# Patient Record
Sex: MALE | Race: WHITE | NOT HISPANIC OR LATINO | URBAN - NONMETROPOLITAN AREA
[De-identification: names, ages, dates, MRNs, and addresses within clinical notes are randomized per-mention and may not be internally consistent; named-entity substitution may affect disease eponyms.]

---

## 2017-02-15 ENCOUNTER — FOLLOW UP ESTABLISHED (OUTPATIENT)
Dept: URBAN - NONMETROPOLITAN AREA CLINIC 3 | Facility: CLINIC | Age: 72
End: 2017-02-15
Payer: COMMERCIAL

## 2017-02-15 DIAGNOSIS — H11.151 PINGUECULA, RIGHT EYE: ICD-10-CM

## 2017-02-15 DIAGNOSIS — Z96.1 PRESENCE OF INTRAOCULAR LENS: ICD-10-CM

## 2017-02-15 DIAGNOSIS — H21.532 IRIDODIALYSIS, LEFT EYE: Primary | ICD-10-CM

## 2017-02-15 PROCEDURE — 92014 COMPRE OPH EXAM EST PT 1/>: CPT | Performed by: OPTOMETRIST

## 2017-02-15 PROCEDURE — 92015 DETERMINE REFRACTIVE STATE: CPT | Performed by: OPTOMETRIST

## 2017-02-15 ASSESSMENT — KERATOMETRY
OD: 42.63
OS: 42.75

## 2017-02-15 ASSESSMENT — VISUAL ACUITY
OS: 20/25
OD: 20/20

## 2017-02-15 ASSESSMENT — INTRAOCULAR PRESSURE
OS: 11
OD: 12

## 2021-10-07 ENCOUNTER — OFFICE VISIT (OUTPATIENT)
Dept: URBAN - NONMETROPOLITAN AREA CLINIC 3 | Facility: CLINIC | Age: 76
End: 2021-10-07
Payer: COMMERCIAL

## 2021-10-07 DIAGNOSIS — H04.123 DRY EYE SYNDROME OF BILATERAL LACRIMAL GLANDS: ICD-10-CM

## 2021-10-07 DIAGNOSIS — H53.2 DIPLOPIA: ICD-10-CM

## 2021-10-07 PROCEDURE — 92134 CPTRZ OPH DX IMG PST SGM RTA: CPT | Performed by: OPTOMETRIST

## 2021-10-07 PROCEDURE — 99203 OFFICE O/P NEW LOW 30 MIN: CPT | Performed by: OPTOMETRIST

## 2021-10-07 ASSESSMENT — VISUAL ACUITY
OD: 20/25
OS: 20/30

## 2021-10-07 ASSESSMENT — INTRAOCULAR PRESSURE
OD: 14
OS: 14

## 2021-10-07 NOTE — IMPRESSION/PLAN
Impression: Presbyopia: H52.4. Plan: Advised patient to wait until after YAG for new glasses Rx. Patient understands and accepts.

## 2021-10-07 NOTE — IMPRESSION/PLAN
Impression: Diplopia: H53.2. Plan: Discussed diagnosis in detail with patient. Intermittent horizontal diplopia as described by patient. Worse at distance. Not seen on exam today. Full eye movements. Advised patient to try covering one eye at home when it occurs to determine if binocular. Pt reports Parkinsons diagnosis. Discussed Parkinsons can affect eye muscles and cause diplopia. RTC if symptoms worsen.

## 2021-10-07 NOTE — IMPRESSION/PLAN
Impression: Other secondary cataract, bilateral: H26.493. Plan:  Discussed diagnosis with the patient. Discussed and reviewed treatment options for cataracts. YAG OU recommended. Patient understands and accepts.

## 2021-11-09 ENCOUNTER — ADULT PHYSICAL (OUTPATIENT)
Dept: URBAN - METROPOLITAN AREA CLINIC 24 | Facility: CLINIC | Age: 76
End: 2021-11-09
Payer: COMMERCIAL

## 2021-11-09 DIAGNOSIS — H26.493 OTHER SECONDARY CATARACT, BILATERAL: ICD-10-CM

## 2021-11-09 DIAGNOSIS — Z01.818 ENCOUNTER FOR OTHER PREPROCEDURAL EXAMINATION: Primary | ICD-10-CM

## 2021-11-09 PROCEDURE — 99203 OFFICE O/P NEW LOW 30 MIN: CPT | Performed by: PHYSICIAN ASSISTANT

## 2021-11-23 ENCOUNTER — SURGERY (OUTPATIENT)
Dept: URBAN - METROPOLITAN AREA SURGERY 12 | Facility: SURGERY | Age: 76
End: 2021-11-23
Payer: COMMERCIAL

## 2021-11-23 PROCEDURE — 66821 AFTER CATARACT LASER SURGERY: CPT | Performed by: OPHTHALMOLOGY

## 2021-12-07 ENCOUNTER — SURGERY (OUTPATIENT)
Dept: URBAN - METROPOLITAN AREA SURGERY 12 | Facility: SURGERY | Age: 76
End: 2021-12-07
Payer: COMMERCIAL

## 2021-12-07 PROCEDURE — 66821 AFTER CATARACT LASER SURGERY: CPT | Performed by: OPHTHALMOLOGY

## 2021-12-21 ENCOUNTER — POST-OPERATIVE VISIT (OUTPATIENT)
Dept: URBAN - NONMETROPOLITAN AREA CLINIC 3 | Facility: CLINIC | Age: 76
End: 2021-12-21
Payer: COMMERCIAL

## 2021-12-21 DIAGNOSIS — H52.4 PRESBYOPIA: ICD-10-CM

## 2021-12-21 DIAGNOSIS — Z48.810 ENCOUNTER FOR SURGICAL AFTERCARE FOLLOWING SURGERY ON A SENSE ORGAN: Primary | ICD-10-CM

## 2021-12-21 PROCEDURE — 99024 POSTOP FOLLOW-UP VISIT: CPT | Performed by: OPTOMETRIST

## 2021-12-21 ASSESSMENT — INTRAOCULAR PRESSURE
OS: 12
OD: 12

## 2021-12-21 ASSESSMENT — VISUAL ACUITY
OD: 20/20
OS: 20/25

## 2021-12-21 NOTE — IMPRESSION/PLAN
Impression:  Encounter for surgical aftercare following surgery on a sense organ  Z48.810. Excellent post op course   Post operative instructions reviewed - Condition is improving - Plan: Discussed diagnosis in detail with patient. New glasses Rx was given today at patients request. Discussed need for re-evaluation with patient. Patient understands and accepts.

## 2022-04-25 ENCOUNTER — OFFICE VISIT (OUTPATIENT)
Dept: URBAN - NONMETROPOLITAN AREA CLINIC 3 | Facility: CLINIC | Age: 77
End: 2022-04-25
Payer: COMMERCIAL

## 2022-04-25 DIAGNOSIS — H11.151 PINGUECULA, RIGHT EYE: ICD-10-CM

## 2022-04-25 DIAGNOSIS — H52.223 REGULAR ASTIGMATISM, BILATERAL: Primary | ICD-10-CM

## 2022-04-25 DIAGNOSIS — H53.142 PHOTOPHOBIA OF LEFT EYE: ICD-10-CM

## 2022-04-25 DIAGNOSIS — H21.532 IRIDODIALYSIS, LEFT EYE: ICD-10-CM

## 2022-04-25 PROCEDURE — 99213 OFFICE O/P EST LOW 20 MIN: CPT | Performed by: OPTOMETRIST

## 2022-04-25 ASSESSMENT — VISUAL ACUITY
OS: 20/20
OD: 20/20

## 2022-04-25 ASSESSMENT — INTRAOCULAR PRESSURE
OS: 16
OD: 16

## 2022-04-25 NOTE — IMPRESSION/PLAN
Impression: Iridodialysis, left eye Plan: OS: Discussed diagnosis in detail with patient. Discussed treatment options with patient. Patient prefers no treatment at this time.

## 2022-04-25 NOTE — IMPRESSION/PLAN
Impression: Photophobia of left eye: H53.142. Plan: secondary  to fixed dilated  pupil. Advised Transition material  as  medical necessity.

## 2024-04-04 ENCOUNTER — OFFICE VISIT (OUTPATIENT)
Dept: URBAN - NONMETROPOLITAN AREA CLINIC 3 | Facility: CLINIC | Age: 79
End: 2024-04-04
Payer: COMMERCIAL

## 2024-04-04 DIAGNOSIS — H04.123 DRY EYE SYNDROME OF BILATERAL LACRIMAL GLANDS: ICD-10-CM

## 2024-04-04 DIAGNOSIS — H53.121 TRANSIENT VISUAL LOSS, RIGHT EYE: Primary | ICD-10-CM

## 2024-04-04 DIAGNOSIS — H52.4 PRESBYOPIA: ICD-10-CM

## 2024-04-04 PROCEDURE — 99213 OFFICE O/P EST LOW 20 MIN: CPT

## 2024-04-04 ASSESSMENT — VISUAL ACUITY
OD: 20/25
OS: 20/25

## 2024-04-04 ASSESSMENT — INTRAOCULAR PRESSURE
OD: 14
OS: 15

## 2024-06-27 ENCOUNTER — OFFICE VISIT (OUTPATIENT)
Dept: URBAN - NONMETROPOLITAN AREA CLINIC 3 | Facility: CLINIC | Age: 79
End: 2024-06-27
Payer: COMMERCIAL

## 2024-06-27 DIAGNOSIS — H53.121 TRANSIENT VISUAL LOSS, RIGHT EYE: Primary | ICD-10-CM

## 2024-06-27 DIAGNOSIS — H04.123 DRY EYE SYNDROME OF BILATERAL LACRIMAL GLANDS: ICD-10-CM

## 2024-06-27 PROCEDURE — 99212 OFFICE O/P EST SF 10 MIN: CPT

## 2024-06-27 RX ORDER — PREDNISOLONE ACETATE 10 MG/ML
1 % SUSPENSION/ DROPS OPHTHALMIC
Qty: 5 | Refills: 0 | Status: ACTIVE
Start: 2024-06-27

## 2024-06-27 RX ORDER — CARBOXYMETHYLCELLULOSE SODIUM, GLYCERIN AND POLYSORBATE 80 5; 10; 5 MG/ML; MG/ML; MG/ML
SOLUTION/ DROPS OPHTHALMIC
Qty: 90 | Refills: 4 | Status: ACTIVE
Start: 2024-06-27

## 2024-06-27 ASSESSMENT — INTRAOCULAR PRESSURE
OD: 14
OS: 15